# Patient Record
Sex: FEMALE | Race: WHITE | NOT HISPANIC OR LATINO | Employment: OTHER | URBAN - METROPOLITAN AREA
[De-identification: names, ages, dates, MRNs, and addresses within clinical notes are randomized per-mention and may not be internally consistent; named-entity substitution may affect disease eponyms.]

---

## 2024-09-27 ENCOUNTER — TELEPHONE (OUTPATIENT)
Age: 69
End: 2024-09-27

## 2024-09-27 NOTE — TELEPHONE ENCOUNTER
Pt gave [place] information where I sent an RTE which verified[place]. I advised pt to bring info with her as she did not receive the card yet. Also told pt to please bring photo ID and list/meds with her to be added to her chart.    Pt was seen at [facility] ED 2 weeks ago when she arrived to [place] from [place] where she was living.

## 2024-09-27 NOTE — TELEPHONE ENCOUNTER
Please verify if she is okay to come in other wise call [phone] this is her friends number.    Establish Care appointment with Dr. Momin on 9/30.    Patient has Medicare A  and in process of getting Medicare B  Approved by Dept. of human services to receive medical assistance.     Please reach out to the patient if there is a problem.  Thank you!!

## 2024-09-27 NOTE — TELEPHONE ENCOUNTER
Left message for pt to call back    Need to confirm if she will have any active insurance when she come in on Monday. If so we need to put it in. If not we would have put pt in as self pay. Need to make pt aware of this

## 2024-10-03 ENCOUNTER — TELEPHONE (OUTPATIENT)
Dept: INTERNAL MEDICINE CLINIC | Facility: CLINIC | Age: 69
End: 2024-10-03

## 2024-10-03 ENCOUNTER — OFFICE VISIT (OUTPATIENT)
Dept: INTERNAL MEDICINE CLINIC | Facility: CLINIC | Age: 69
End: 2024-10-03
Payer: COMMERCIAL

## 2024-10-03 VITALS
TEMPERATURE: 97.3 F | SYSTOLIC BLOOD PRESSURE: 130 MMHG | DIASTOLIC BLOOD PRESSURE: 96 MMHG | BODY MASS INDEX: 22.58 KG/M2 | OXYGEN SATURATION: 97 % | HEART RATE: 94 BPM | HEIGHT: 60 IN | WEIGHT: 115 LBS

## 2024-10-03 DIAGNOSIS — G89.29 OTHER CHRONIC PAIN: ICD-10-CM

## 2024-10-03 DIAGNOSIS — F41.9 ANXIETY: ICD-10-CM

## 2024-10-03 DIAGNOSIS — E78.1 HYPERTRIGLYCERIDEMIA: ICD-10-CM

## 2024-10-03 DIAGNOSIS — Z76.89 ESTABLISHING CARE WITH NEW DOCTOR, ENCOUNTER FOR: ICD-10-CM

## 2024-10-03 DIAGNOSIS — B35.1 ONYCHOMYCOSIS: ICD-10-CM

## 2024-10-03 DIAGNOSIS — I10 PRIMARY HYPERTENSION: Primary | ICD-10-CM

## 2024-10-03 PROCEDURE — 99203 OFFICE O/P NEW LOW 30 MIN: CPT | Performed by: INTERNAL MEDICINE

## 2024-10-03 RX ORDER — FOLIC ACID 1 MG/1
TABLET ORAL
COMMUNITY

## 2024-10-03 RX ORDER — CICLOPIROX 80 MG/ML
SOLUTION TOPICAL
Qty: 6 ML | Refills: 0 | Status: SHIPPED | OUTPATIENT
Start: 2024-10-03 | End: 2025-04-01

## 2024-10-03 RX ORDER — CLONAZEPAM 2 MG/1
2 TABLET ORAL
Qty: 30 TABLET | Refills: 0 | Status: SHIPPED | OUTPATIENT
Start: 2024-10-03

## 2024-10-03 RX ORDER — CLONAZEPAM 2 MG/1
2 TABLET ORAL 2 TIMES DAILY
COMMUNITY
End: 2024-10-03 | Stop reason: SDUPTHER

## 2024-10-03 RX ORDER — LOSARTAN POTASSIUM 50 MG/1
50 TABLET ORAL DAILY
COMMUNITY
End: 2024-10-03 | Stop reason: SDUPTHER

## 2024-10-03 RX ORDER — FENOFIBRATE 145 MG/1
145 TABLET, COATED ORAL DAILY
Qty: 90 TABLET | Refills: 0 | Status: SHIPPED | OUTPATIENT
Start: 2024-10-03

## 2024-10-03 RX ORDER — OXYCODONE AND ACETAMINOPHEN 5; 325 MG/1; MG/1
1 TABLET ORAL EVERY 4 HOURS PRN
Qty: 7 TABLET | Refills: 0 | Status: SHIPPED | OUTPATIENT
Start: 2024-10-03

## 2024-10-03 RX ORDER — FENOFIBRATE 145 MG/1
145 TABLET, COATED ORAL DAILY
COMMUNITY

## 2024-10-03 RX ORDER — KETOCONAZOLE 20 MG/G
CREAM TOPICAL DAILY
COMMUNITY

## 2024-10-03 RX ORDER — LOSARTAN POTASSIUM 50 MG/1
50 TABLET ORAL DAILY
Qty: 90 TABLET | Refills: 0 | Status: SHIPPED | OUTPATIENT
Start: 2024-10-03

## 2024-10-03 RX ORDER — DIPHENOXYLATE HYDROCHLORIDE AND ATROPINE SULFATE 2.5; .025 MG/1; MG/1
1 TABLET ORAL DAILY
COMMUNITY

## 2024-10-03 NOTE — TELEPHONE ENCOUNTER
Pt reached the after hours service, she states she does not have a printer or transportation to go to an office and get a DAMIR form, she requested the [employer] link and she is going to try and setup care-everywhere to link the charts.

## 2024-10-03 NOTE — TELEPHONE ENCOUNTER
Called and left a message for patient to call back the office , we need her medical records from her ER visit to [facility] , If she has a My chart account she can link care everywhere there , so we can see all the records and test results the # to my chart to call if she needs help with that is  or if she does have the link on her phone for [facility]  to give them permission to release the records to us , she stated she had it on her phone . The # to reach patient is  .

## 2024-10-03 NOTE — PROGRESS NOTES
Ambulatory Visit  Name: Qiana Sanches      : 1955      MRN: [id number]  Encounter Provider: Samuel Momin MD  Encounter Date: 10/3/2024   Encounter department: [facility] INTERNAL MEDICINE    Assessment & Plan  Establishing care with new doctor, encounter for         Primary hypertension  Fairly controlled, continue losartan  Orders:    losartan (COZAAR) 50 mg tablet; Take 1 tablet (50 mg total) by mouth daily    Anxiety  PDMP reviewed, no red flags, 30 days supply given  Orders:    clonazePAM (KlonoPIN) 2 mg tablet; Take 1 tablet (2 mg total) by mouth daily at bedtime    Hypertriglyceridemia  No blood work or to review, patient will get blood work documents from [facility] ER visit.  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  She reports she has a history of hypertension on losartan, hypertriglyceridemia on fenofibrate, anxiety on Klonopin, and chronic low backache due to scoliosis for which she takes as needed narcotic pain medications.  Also reports a discoloration of the right big toe nail.    Reports she was recently evaluated at the [facility] ER d 2 weeks ago for dizziness and was a discharged same day.  Unable to review any care everywhere records as they are not available.  Enies any chest pain shortness of breath nausea vomiting or  diarrhea      Review of Systems   Constitutional:  Negative for appetite change, chills, diaphoresis, fatigue, fever and unexpected weight change.   Respiratory:  Negative for apnea, cough, choking, chest tightness, shortness of breath, wheezing and stridor.    Cardiovascular:  Negative for chest pain, palpitations and leg swelling.   Gastrointestinal:  Negative for abdominal distention, abdominal pain, anal bleeding, blood in stool, constipation, diarrhea, nausea and vomiting.   Genitourinary:  Negative for decreased urine volume, difficulty urinating, frequency and urgency.   Musculoskeletal:  Positive for back pain. Negative for arthralgias and myalgias.   Skin:         Big toenail is discolored   Neurological:  Negative for dizziness, light-headedness, numbness and headaches.           Objective     /96 (BP Location: Left arm, Patient Position: Sitting, Cuff Size: Standard)   Pulse 94   Temp (!) 97.3 °F (36.3 °C) (Temporal)   Ht 5' (1.524 m)   Wt 52.2 kg (115 lb)   SpO2 97%   BMI 22.46 kg/m²     Physical Exam  Constitutional:       General: She is not in acute distress.     Appearance: Normal appearance. She is normal weight. She is not ill-appearing, toxic-appearing or diaphoretic.   Cardiovascular:      Rate and Rhythm: Normal rate and regular rhythm.      Pulses: Normal pulses.      Heart sounds: Normal heart sounds. No murmur heard.     No gallop.   Pulmonary:      Effort: Pulmonary effort is normal. No respiratory distress.      Breath sounds: Normal breath sounds. No stridor. No wheezing, rhonchi or rales.   Chest:      Chest wall: No tenderness.   Musculoskeletal:      Right lower leg: No edema.      Left lower leg: No edema.   Skin:     Comments: Onychomycosis of the big toenail   Neurological:      Mental Status: She is alert and oriented to person, place, and time.

## 2024-10-04 NOTE — TELEPHONE ENCOUNTER
Pt called looking for help getting her lab records from [facility].  She was unsure how to navigate "[employer] to get charts linked. I was instructed to transfer her to the [employer] team for assistance.

## 2024-10-10 ENCOUNTER — OFFICE VISIT (OUTPATIENT)
Dept: INTERNAL MEDICINE CLINIC | Facility: CLINIC | Age: 69
End: 2024-10-10

## 2024-10-10 VITALS
SYSTOLIC BLOOD PRESSURE: 164 MMHG | BODY MASS INDEX: 22.97 KG/M2 | HEIGHT: 60 IN | DIASTOLIC BLOOD PRESSURE: 100 MMHG | OXYGEN SATURATION: 97 % | WEIGHT: 117 LBS | HEART RATE: 104 BPM | TEMPERATURE: 98.3 F

## 2024-10-10 DIAGNOSIS — I10 PRIMARY HYPERTENSION: Primary | ICD-10-CM

## 2024-10-10 PROCEDURE — 99213 OFFICE O/P EST LOW 20 MIN: CPT | Performed by: INTERNAL MEDICINE

## 2024-10-10 NOTE — PROGRESS NOTES
Ambulatory Visit  Name: Qiana Sanches      : 1955      MRN: [id number]  Encounter Provider: Samuel Momin MD  Encounter Date: 10/10/2024   Encounter department: [facility] INTERNAL MEDICINE    Assessment & Plan  Primary hypertension  Repeat /86.  I recommend home BP monitoring, encouraged DASH diet, continue losartan and current dosages.  No blood work available to review, patient says she recently had them done at the [facility].  Follow-up with blood work report in 3 months          History of Present Illness     HPI  Patient comes for follow-up of hypertension        Review of Systems   Constitutional:  Negative for appetite change, chills, diaphoresis, fatigue, fever and unexpected weight change.   Respiratory:  Negative for apnea, cough, choking, chest tightness, shortness of breath, wheezing and stridor.    Cardiovascular:  Negative for chest pain, palpitations and leg swelling.   Gastrointestinal:  Negative for abdominal distention, abdominal pain, anal bleeding, blood in stool, constipation, diarrhea, nausea and vomiting.   Genitourinary:  Negative for decreased urine volume, difficulty urinating, frequency and urgency.   Musculoskeletal:  Negative for arthralgias, back pain and myalgias.   Neurological:  Negative for dizziness, light-headedness, numbness and headaches.     Medical History Reviewed by provider this encounter:  Tobacco  Allergies  Meds  Problems  Med Hx  Surg Hx  Fam Hx       Past Medical History   Past Medical History:   Diagnosis Date    Anxiety     Hypertension     Scoliosis      Past Surgical History:   Procedure Laterality Date    CYST REMOVAL       Family History   Problem Relation Age of Onset    Alzheimer's disease Father      Current Outpatient Medications on File Prior to Visit   Medication Sig Dispense Refill    ciclopirox (PENLAC) 8 % solution Apply topically daily at bedtime 6 mL 0    clonazePAM (KlonoPIN) 2 mg tablet  Take 1 tablet (2 mg total) by mouth daily at bedtime 30 tablet 0    fenofibrate (TRICOR) 145 mg tablet Take 145 mg by mouth daily      fenofibrate (TRICOR) 145 mg tablet Take 1 tablet (145 mg total) by mouth daily 90 tablet 0    folic acid (FOLVITE) 1 mg tablet       ketoconazole (NIZORAL) 2 % cream Apply topically daily      losartan (COZAAR) 50 mg tablet Take 1 tablet (50 mg total) by mouth daily 90 tablet 0    multivitamin (THERAGRAN) TABS Take 1 tablet by mouth daily      oxyCODONE-acetaminophen (Percocet) 5-325 mg per tablet Take 1 tablet by mouth every 4 (four) hours as needed for moderate pain Max Daily Amount: 6 tablets 7 tablet 0     No current facility-administered medications on file prior to visit.     Allergies   Allergen Reactions    Nuts - Food Allergy Hives      Current Outpatient Medications on File Prior to Visit   Medication Sig Dispense Refill    ciclopirox (PENLAC) 8 % solution Apply topically daily at bedtime 6 mL 0    clonazePAM (KlonoPIN) 2 mg tablet Take 1 tablet (2 mg total) by mouth daily at bedtime 30 tablet 0    fenofibrate (TRICOR) 145 mg tablet Take 145 mg by mouth daily      fenofibrate (TRICOR) 145 mg tablet Take 1 tablet (145 mg total) by mouth daily 90 tablet 0    folic acid (FOLVITE) 1 mg tablet       ketoconazole (NIZORAL) 2 % cream Apply topically daily      losartan (COZAAR) 50 mg tablet Take 1 tablet (50 mg total) by mouth daily 90 tablet 0    multivitamin (THERAGRAN) TABS Take 1 tablet by mouth daily      oxyCODONE-acetaminophen (Percocet) 5-325 mg per tablet Take 1 tablet by mouth every 4 (four) hours as needed for moderate pain Max Daily Amount: 6 tablets 7 tablet 0     No current facility-administered medications on file prior to visit.      Social History     Tobacco Use    Smoking status: Every Day     Current packs/day: 0.50     Average packs/day: 0.5 packs/day for 54.8 years (27.4 ttl pk-yrs)     Types: Cigarettes     Start date: 1970    Smokeless tobacco: Never    Vaping Use    Vaping status: Never Used   Substance and Sexual Activity    Alcohol use: Yes     Comment: daily    Drug use: Never    Sexual activity: Not on file         Objective     /100 (BP Location: Left arm, Patient Position: Sitting, Cuff Size: Standard)   Pulse 104   Temp 98.3 °F (36.8 °C) (Temporal)   Ht 5' (1.524 m)   Wt 53.1 kg (117 lb)   SpO2 97%   BMI 22.85 kg/m²     Physical Exam  Constitutional:       General: She is not in acute distress.     Appearance: Normal appearance. She is normal weight. She is not ill-appearing, toxic-appearing or diaphoretic.   Cardiovascular:      Rate and Rhythm: Normal rate and regular rhythm.      Pulses: Normal pulses.      Heart sounds: Normal heart sounds. No murmur heard.     No gallop.   Pulmonary:      Effort: Pulmonary effort is normal. No respiratory distress.      Breath sounds: Normal breath sounds. No stridor. No wheezing, rhonchi or rales.   Chest:      Chest wall: No tenderness.   Musculoskeletal:      Right lower leg: No edema.      Left lower leg: No edema.   Neurological:      Mental Status: She is alert and oriented to person, place, and time.

## 2024-10-22 ENCOUNTER — TELEPHONE (OUTPATIENT)
Age: 69
End: 2024-10-22

## 2024-10-22 DIAGNOSIS — F41.9 ANXIETY: ICD-10-CM

## 2024-10-22 DIAGNOSIS — G89.29 OTHER CHRONIC PAIN: ICD-10-CM

## 2024-10-22 DIAGNOSIS — E56.9 VITAMIN DEFICIENCY: Primary | ICD-10-CM

## 2024-10-22 DIAGNOSIS — B35.1 ONYCHOMYCOSIS: ICD-10-CM

## 2024-10-22 DIAGNOSIS — I10 PRIMARY HYPERTENSION: ICD-10-CM

## 2024-10-22 RX ORDER — FENOFIBRATE 145 MG/1
145 TABLET, COATED ORAL DAILY
Qty: 90 TABLET | Refills: 0 | OUTPATIENT
Start: 2024-10-22

## 2024-10-22 RX ORDER — OXYCODONE AND ACETAMINOPHEN 5; 325 MG/1; MG/1
1 TABLET ORAL EVERY 4 HOURS PRN
Qty: 7 TABLET | Refills: 0 | OUTPATIENT
Start: 2024-10-22

## 2024-10-22 RX ORDER — CLONAZEPAM 2 MG/1
2 TABLET ORAL
Qty: 30 TABLET | Refills: 0 | OUTPATIENT
Start: 2024-10-22

## 2024-10-22 RX ORDER — LOSARTAN POTASSIUM 50 MG/1
50 TABLET ORAL DAILY
Qty: 90 TABLET | Refills: 0 | OUTPATIENT
Start: 2024-10-22

## 2024-10-22 NOTE — TELEPHONE ENCOUNTER
Medication:oxyCODONE-acetaminophen (Percocet) 5-325    Dose/Frequency:  Take 1 tablet by mouth every 4 (four) hours as needed for moderate pain Max Daily Amount: 6 tablets    Quantity: 7 tablet    Pharmacy:   [id number]  [place], [place] [facility] [address] [facility] [address]  [address] [place] [address]    Office:   [x] PCP/Provider -   [] Speciality/Provider -     Does the patient have enough for 3 days?   [] Yes   [x] No - Send as HP to POD       Medication: clonazePAM (KlonoPIN) 2 mg tablet    Dose/Frequency: Take 1 tablet (2 mg total) by mouth daily at bedtime    Quantity:  30 tablet    Pharmacy:   [id number]  [place], [place] [facility] [address] [address]    Office:   [x] PCP/Provider -   [] Speciality/Provider -     Does the patient have enough for 3 days?   [] Yes   [x] No - Send as HP to POD

## 2024-10-22 NOTE — TELEPHONE ENCOUNTER
Medication: clonazePAM (KlonoPIN) 2 mg tablet     Dose/Frequency: Take 1 tablet (2 mg total) by mouth daily at bedtime     Quantity: 30 tabs    Pharmacy: [facility] [place] [id number] - [place], [place] -     Office:   [x] PCP/Provider -   [] Speciality/Provider -     Does the patient have enough for 3 days?   [] Yes   [] No - Send as HP to POD    Medication:     fenofibrate (TRICOR) 145 mg tablet       Dose/Frequency:     Take 1 tablet (145 mg total) by mouth daily       Quantity: 90 tabs    [facility] [place] [id number] - [place], [place] -     Office:   [x] PCP/Provider -   [] Speciality/Provider -     Does the patient have enough for 3 days?   [] Yes   [] No - Send as HP to POD    Medication:     losartan (COZAAR) 50 mg tablet       Dose/Frequency: Take 1 tablet (50 mg total) by mouth daily     Quantity: 90 tabs    Pharmacy: [facility] [place] [id number] - [place], [place] -     Office:   [x] PCP/Provider -   [] Speciality/Provider -     Does the patient have enough for 3 days?   [] Yes   [] No - Send as HP to POD    Medication: oxyCODONE-acetaminophen (Percocet) 5-325 mg per tablet     Dose/Frequency: Take 1 tablet by mouth every 4 (four) hours as needed for moderate pain Max Daily Amount: 6 tablets     Quantity: 7 tabs?    Pharmacy: [facility] [place] [id number] - [place], [place] -     Office:   [x] PCP/Provider -   [] Speciality/Provider -     Does the patient have enough for 3 days?   [] Yes   [x] No - Send as HP to POD

## 2024-10-22 NOTE — TELEPHONE ENCOUNTER
Pt called. Unable to access My Chart. Has contacted IT department, but was told there is a 17 hour wait. Pt reports that she has been emailing Provider, via personal email, to get lab records from recent ED visit and submitted auth for release form. Pt has not heard back from Provider.     Pt requests refills of her medications, states only has enough for the next 5-7 days. Pt states that she was told Dr Momin needs her lab results to refill her medications. Refill request submitted. No lab results seen in EMR.    Pt last seen 10/10/24. Please inform PCP and further advise. Pt is requesting phone call. Thank you

## 2024-10-22 NOTE — TELEPHONE ENCOUNTER
Please let the patient know that a 90-day supply of fenofibrate, losartan was resent on 10/3/2024 to [facility] pharmacy.  30 days supply of her Klonopin, and 6 tablets of Percocet were sent on the same day.  She should not be running out of prescriptions so soon.  I have not received any blood work from her recent ER visit, please let the patient know that future refills can be given, pending receipt of her blood work blood work

## 2024-10-23 ENCOUNTER — PATIENT MESSAGE (OUTPATIENT)
Dept: INTERNAL MEDICINE CLINIC | Facility: CLINIC | Age: 69
End: 2024-10-23

## 2024-10-23 ENCOUNTER — TELEPHONE (OUTPATIENT)
Dept: INTERNAL MEDICINE CLINIC | Facility: CLINIC | Age: 69
End: 2024-10-23

## 2024-10-23 DIAGNOSIS — F11.90 OPIOID USE: Primary | ICD-10-CM

## 2024-10-23 DIAGNOSIS — F41.9 ANXIETY: ICD-10-CM

## 2024-10-23 RX ORDER — LANOLIN ALCOHOL/MO/W.PET/CERES
400 CREAM (GRAM) TOPICAL DAILY
Start: 2024-10-23 | End: 2024-10-24 | Stop reason: SDUPTHER

## 2024-10-23 RX ORDER — DIPHENOXYLATE HYDROCHLORIDE AND ATROPINE SULFATE 2.5; .025 MG/1; MG/1
1 TABLET ORAL DAILY
Qty: 90 TABLET | Refills: 0 | Status: SHIPPED | OUTPATIENT
Start: 2024-10-23 | End: 2025-01-21

## 2024-10-23 RX ORDER — OXYCODONE AND ACETAMINOPHEN 5; 325 MG/1; MG/1
1 TABLET ORAL EVERY 8 HOURS PRN
Start: 2024-10-23 | End: 2024-10-24 | Stop reason: SDUPTHER

## 2024-10-23 RX ORDER — CLONAZEPAM 2 MG/1
2 TABLET ORAL
Qty: 30 TABLET | Refills: 0 | Status: SHIPPED | OUTPATIENT
Start: 2024-11-01 | End: 2024-12-01

## 2024-10-23 RX ORDER — CLONAZEPAM 2 MG/1
2 TABLET ORAL
Start: 2024-10-23

## 2024-10-23 NOTE — TELEPHONE ENCOUNTER
Pt calling RX line- pt very upset and agitated about the medications that are being sent and not sent. Please give pt a call to clarify what is going on. She is not understanding why oxys are not being sent or why she is only being given 7 tablets. Pt doesn't understand why her klonopin isn't being refilled or her folic acid. Pt upset and feels like she is being treated like a ping pong ball. Pt still can not understand why we do not have her lab work. Pt wants all her medications to be set up for next month she only wants to go to the pharmacy 1 time a month. Pt does not understand why all her medications can not be sent at one time. Pt does not have a car and doesn't want to take multiple ubers a month. Pt thinks it is outrageous to wait until 11-1-24 for her medications.   Pt very unhappy with her care.     Please give pt a call at you earliest convenience.

## 2024-10-23 NOTE — TELEPHONE ENCOUNTER
Please let the patient know that 90-day supply of fenofibrate, folic acid, losartan, multivitamin were already sent to the pharmacy.    I can only give 30 days supply of Klonopin and oxycodone at 1 time, combination of her benzodiazepine and narcotics is not recommended, and oxycodone is only for as needed basis .  Opioid agreement paperwork is yet to be completed.   recovery window from their procedure. The patient was made aware that bryce Covid-19  may worsen their prognosis for recovering from their procedure and lend to a higher morbidity and/or mortality risk. All material risks, benefits, and reasonable alternatives including postponing the procedure were discussed. The patient does  wish to proceed with the procedure at this time.       Signed By: Kobi Middleton MD     December 28, 2023 6:44 AM

## 2024-10-23 NOTE — TELEPHONE ENCOUNTER
Folic acid and a multivitamin are over-the-counter meds, I have still center prescription for the same    Recommend Percocet every 8 hours only as needed for severe pain.  Recommend additional use of her Tylenol 500 mg every 4 hours/ibuprofen 400 mg every 3 hours as needed for mild to moderate pain.  She can additionally use local lidocaine patches or Voltaren gel topically for pain improvement.  All these medications are over-the-counter    Cannot refill Klonopin earlier than indicated.    Patient needs to give us documentation of the previous  blood work done, and also complete the opioid agreement to plan before we can give her future refills.

## 2024-10-23 NOTE — TELEPHONE ENCOUNTER
Called and spoke to patient, she hung up mid conversation, patient unhappy with office/provider as she feels insulted Dr. Momin prescribed Narcan. Patient is insisting on having her medications for 30 days, Percocet and klonopin included. She kept cutting me off when I tried to explain anything.      Please advise further.

## 2024-10-24 ENCOUNTER — PATIENT MESSAGE (OUTPATIENT)
Dept: INTERNAL MEDICINE CLINIC | Facility: CLINIC | Age: 69
End: 2024-10-24

## 2024-10-24 ENCOUNTER — TELEPHONE (OUTPATIENT)
Age: 69
End: 2024-10-24

## 2024-10-24 DIAGNOSIS — E56.9 VITAMIN DEFICIENCY: ICD-10-CM

## 2024-10-24 DIAGNOSIS — F11.90 OPIOID USE: ICD-10-CM

## 2024-10-24 DIAGNOSIS — G89.29 OTHER CHRONIC PAIN: ICD-10-CM

## 2024-10-24 RX ORDER — LANOLIN ALCOHOL/MO/W.PET/CERES
400 CREAM (GRAM) TOPICAL DAILY
Qty: 30 TABLET | Refills: 2 | Status: SHIPPED | OUTPATIENT
Start: 2024-10-24 | End: 2025-01-22

## 2024-10-24 RX ORDER — OXYCODONE AND ACETAMINOPHEN 5; 325 MG/1; MG/1
1 TABLET ORAL EVERY 8 HOURS PRN
Qty: 10 TABLET | Refills: 0 | Status: SHIPPED | OUTPATIENT
Start: 2024-10-24

## 2024-10-24 NOTE — TELEPHONE ENCOUNTER
Patient called again to follow-up on her medication/requested refills. Patient states she spoke with Laila within the last hour and was told that her prescriptions were all sent to [facility]. Patient has contacted [facility] and previous pharmacy ([facility]) and neither location has her Percocet ordered. Patient expressing frustration, again stating she should be able to pick these up 6 days early to avoid an additional Uber trip. Relayed to patient that the state regulates how much/how often controlled substances can be dispensed. Patient requested to speak with Laila directly. Attempted transfer to clinical line, unavailable at this time. Please review and advise patient.

## 2024-10-29 ENCOUNTER — PATIENT MESSAGE (OUTPATIENT)
Dept: INTERNAL MEDICINE CLINIC | Facility: CLINIC | Age: 69
End: 2024-10-29

## 2024-10-31 ENCOUNTER — PATIENT MESSAGE (OUTPATIENT)
Dept: INTERNAL MEDICINE CLINIC | Facility: CLINIC | Age: 69
End: 2024-10-31

## 2024-11-05 NOTE — PATIENT COMMUNICATION
Patient called in to establish care with New provider in office. She is concerned about cost of visit. I spoke with clerical regarding. Patient will make appointment and may need to set up payment plan. She is scheduled 11/18/24 with Dr. Cifuentes. She is asking him to review her situation and to be sure he is comfortable with prescribing for her. She mentioned going to pain management as an alternative.

## 2024-11-16 ENCOUNTER — TELEPHONE (OUTPATIENT)
Dept: OTHER | Facility: OTHER | Age: 69
End: 2024-11-16

## 2024-11-16 NOTE — TELEPHONE ENCOUNTER
Pt called because she needs to schedule an appt with pain management. Please call pt at your earliest convenience

## 2024-11-18 ENCOUNTER — TELEPHONE (OUTPATIENT)
Age: 69
End: 2024-11-18

## 2024-11-18 NOTE — TELEPHONE ENCOUNTER
Patient called back, would like to still see Dr Cifuentes  until she can see pain management for her meds.

## 2024-11-21 ENCOUNTER — TELEPHONE (OUTPATIENT)
Age: 69
End: 2024-11-21

## 2024-11-21 NOTE — TELEPHONE ENCOUNTER
Transferred pt to [facility] for refill. I read [facility] office disclaimer. Pt stated she needs a refill for the oxy ,folic acid (FOLVITE) 400 mcg tablet, clonazePAM (KlonoPIN) 2 mg tablet.   I advised pt to contact PCP.

## 2024-11-26 ENCOUNTER — TELEPHONE (OUTPATIENT)
Age: 69
End: 2024-11-26

## 2024-11-26 ENCOUNTER — OFFICE VISIT (OUTPATIENT)
Dept: FAMILY MEDICINE CLINIC | Facility: CLINIC | Age: 69
End: 2024-11-26

## 2024-11-26 VITALS
OXYGEN SATURATION: 97 % | TEMPERATURE: 97.9 F | SYSTOLIC BLOOD PRESSURE: 132 MMHG | HEART RATE: 101 BPM | BODY MASS INDEX: 24.6 KG/M2 | RESPIRATION RATE: 18 BRPM | HEIGHT: 59 IN | WEIGHT: 122 LBS | DIASTOLIC BLOOD PRESSURE: 60 MMHG

## 2024-11-26 DIAGNOSIS — G89.29 OTHER CHRONIC PAIN: ICD-10-CM

## 2024-11-26 DIAGNOSIS — E78.1 HYPERTRIGLYCERIDEMIA: ICD-10-CM

## 2024-11-26 DIAGNOSIS — M41.9 SCOLIOSIS, UNSPECIFIED SCOLIOSIS TYPE, UNSPECIFIED SPINAL REGION: Primary | ICD-10-CM

## 2024-11-26 DIAGNOSIS — I10 PRIMARY HYPERTENSION: ICD-10-CM

## 2024-11-26 DIAGNOSIS — F17.219 CIGARETTE NICOTINE DEPENDENCE WITH NICOTINE-INDUCED DISORDER: ICD-10-CM

## 2024-11-26 DIAGNOSIS — B35.1 ONYCHOMYCOSIS: ICD-10-CM

## 2024-11-26 DIAGNOSIS — R74.8 ELEVATED LIVER ENZYMES: ICD-10-CM

## 2024-11-26 DIAGNOSIS — F41.9 ANXIETY: ICD-10-CM

## 2024-11-26 DIAGNOSIS — F10.10 ALCOHOL ABUSE: ICD-10-CM

## 2024-11-26 PROCEDURE — 99203 OFFICE O/P NEW LOW 30 MIN: CPT | Performed by: FAMILY MEDICINE

## 2024-11-26 RX ORDER — CLONAZEPAM 2 MG/1
2 TABLET ORAL
Qty: 30 TABLET | Refills: 0 | Status: SHIPPED | OUTPATIENT
Start: 2024-11-26 | End: 2024-11-29 | Stop reason: SDUPTHER

## 2024-11-26 RX ORDER — OXYCODONE AND ACETAMINOPHEN 5; 325 MG/1; MG/1
1 TABLET ORAL DAILY PRN
Qty: 10 TABLET | Refills: 0 | Status: SHIPPED | OUTPATIENT
Start: 2024-11-26

## 2024-11-26 NOTE — ASSESSMENT & PLAN NOTE
Give 10 tabs of percocet for daily prn use. SE educated pt. Do not take it with clonazepam or alcohol which may cause overdose.   I told pt we do not recommend long term opioid for pain.   Plan to check back xray and refer to specialist when she gets her medical insurance.       Orders:    oxyCODONE-acetaminophen (Percocet) 5-325 mg per tablet; Take 1 tablet by mouth daily as needed for severe pain Max Daily Amount: 1 tablet

## 2024-11-26 NOTE — TELEPHONE ENCOUNTER
Patient called in upset stating that she has not received a new script as she has requested for clonazepam.   Patient states prescription needs to be changed to a higher dose due to having seizures. Please review and send in to   [facility] [place] [id number] - [place], [place] -

## 2024-11-26 NOTE — PROGRESS NOTES
Name: Qiana Sanches      : 1955      MRN: [id number]  Encounter Provider: Chino Wright MD  Encounter Date: 2024   Encounter department: [facility] (formerly [facility]3] PRACTICE  :  Assessment & Plan  Scoliosis, unspecified scoliosis type, unspecified spinal region  Pt refused xray or see specialist today because self pay.          Other chronic pain  Give 10 tabs of percocet for daily prn use. SE educated pt. Do not take it with clonazepam or alcohol which may cause overdose.   I told pt we do not recommend long term opioid for pain.   Plan to check back xray and refer to specialist when she gets her medical insurance.       Orders:    oxyCODONE-acetaminophen (Percocet) 5-325 mg per tablet; Take 1 tablet by mouth daily as needed for severe pain Max Daily Amount: 1 tablet    Anxiety  Give clonazepam. SE educated pt. Do not use it with alcohol.   Orders:    clonazePAM (KlonoPIN) 2 mg tablet; Take 1 tablet (2 mg total) by mouth daily at bedtime as needed for seizures    Primary hypertension  DASH diet. Continue losartan 50mg QD.        Hypertriglyceridemia  Low fat diet. Continue fenofibrate 145mg daily.        Onychomycosis  Continue ciclopirox solution.        Alcohol abuse  Strongly advised pt to quit!       Elevated liver enzymes  2024 AST 83, ALT 31. Pt refused to recheck labs because self pay.        Cigarette nicotine dependence with nicotine-induced disorder  Strongly advised pt to quit!             Depression Screening and Follow-up Plan: Patient was screened for depression during today's encounter. They screened negative with a PHQ-2 score of 2.    Tobacco Cessation Counseling: Tobacco cessation counseling was provided. The patient is sincerely urged to quit consumption of tobacco. She is ready to quit tobacco. Medication options and side effects of medication discussed. Patient refused medication.       History of Present Illness     HPI    Pt is here with her therapeutic dog to establish care.    Was in [place] for years.   Came here in 9/2024. Live with her friends/10 people in a house now.     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     Insomnia/anxiety---for years.   She uses clonazepam 2mg qhs which helped. She can sleep 4-5 hours with clonazepam.   Need refill of clonazepam today.     Scoliosis---for years. Lower back pain constantly.   Saw pain specialist in [place] before. Xray showed scoliosis per pt.   Denies hx of surgery of back.   She was on opioid medication, tylenol with codeine, percocet etc per pt. Need refill today.     HTN---For 5 years. She is on losartn 50mg QD. BP is ok today.     Hyperlipidemia---For 5 years. She is on fenofibrate 145mg QD. Refused labs because self pay now.   She is on fish oil.     Onychomycosis----She uses ciclopirox and ketoconazole cream.     Smoke 0.5ppd for 20 years. Refused nicotine patch/gum/medication because she is self pay.   Alcohol 1-2 glass of hard liquor, several times per week.   No drugs.     Does all ADL's.   Denies recent falls.   Always depressed. Refused medications. She has her therapeutic dog.         Review of Systems   Constitutional:  Negative for appetite change, chills and fever.   HENT:  Negative for congestion, ear pain, sinus pain and sore throat.    Eyes:  Negative for discharge and itching.   Respiratory:  Negative for apnea, cough, chest tightness, shortness of breath and wheezing.    Cardiovascular:  Negative for chest pain, palpitations and leg swelling.   Gastrointestinal:  Negative for abdominal pain, anal bleeding, constipation, diarrhea, nausea and vomiting.   Endocrine: Negative for cold intolerance, heat intolerance and polyuria.   Genitourinary:  Negative for difficulty urinating and dysuria.   Musculoskeletal:  Positive for back pain. Negative for arthralgias and myalgias.   Skin:  Negative for rash.   Neurological:  Negative for dizziness and headaches.   Psychiatric/Behavioral:  Positive for sleep  disturbance. Negative for agitation, self-injury and suicidal ideas. The patient is nervous/anxious.      Past Medical History   Past Medical History:   Diagnosis Date    Anxiety     Hypertension     Scoliosis      Past Surgical History:   Procedure Laterality Date    CYST REMOVAL      HYSTERECTOMY      years ago per pt     Family History   Problem Relation Age of Onset    Hypertension Mother     Alzheimer's disease Father       reports that she has been smoking cigarettes. She started smoking about 54 years ago. She has a 10 pack-year smoking history. She has never used smokeless tobacco. She reports current alcohol use of about 21.0 standard drinks of alcohol per week. She reports that she does not use drugs.  Current Outpatient Medications on File Prior to Visit   Medication Sig Dispense Refill    ciclopirox (PENLAC) 8 % solution Apply topically daily at bedtime 6 mL 0    fenofibrate (TRICOR) 145 mg tablet Take 1 tablet (145 mg total) by mouth daily 90 tablet 0    folic acid (FOLVITE) 400 mcg tablet Take 1 tablet (400 mcg total) by mouth daily 30 tablet 2    ketoconazole (NIZORAL) 2 % cream Apply topically daily      losartan (COZAAR) 50 mg tablet Take 1 tablet (50 mg total) by mouth daily 90 tablet 0    multivitamin (THERAGRAN) TABS Take 1 tablet by mouth daily 90 tablet 0    [DISCONTINUED] clonazePAM (KlonoPIN) 2 mg tablet Take 1 tablet (2 mg total) by mouth daily at bedtime Do not start before November 1, 2024. 30 tablet 0    [DISCONTINUED] oxyCODONE-acetaminophen (Percocet) 5-325 mg per tablet Take 1 tablet by mouth every 8 (eight) hours as needed for severe pain Max Daily Amount: 3 tablets 10 tablet 0    naloxone (NARCAN) 4 mg/0.1 mL nasal spray Administer 1 spray into a nostril. If no response after 2-3 minutes, give another dose in the other nostril using a new spray. 1 each 1    [DISCONTINUED] fenofibrate (TRICOR) 145 mg tablet Take 145 mg by mouth daily       No current facility-administered medications  "on file prior to visit.     Allergies   Allergen Reactions    Morphine Itching    Nuts - Food Allergy Hives    Tramadol Headache           Objective   /60 (BP Location: Right arm, Patient Position: Sitting, Cuff Size: Standard)   Pulse 101   Temp 97.9 °F (36.6 °C) (Temporal)   Resp 18   Ht 4' 10.75\" (1.492 m)   Wt 55.3 kg (122 lb)   SpO2 97%   BMI 24.85 kg/m²      Physical Exam  Constitutional:       General: She is not in acute distress.     Appearance: She is well-developed.   HENT:      Head: Normocephalic.   Eyes:      General:         Right eye: No discharge.         Left eye: No discharge.      Conjunctiva/sclera: Conjunctivae normal.   Neck:      Thyroid: No thyromegaly.   Cardiovascular:      Rate and Rhythm: Normal rate and regular rhythm.      Heart sounds: Normal heart sounds. No murmur heard.     No friction rub. No gallop.   Pulmonary:      Effort: Pulmonary effort is normal. No respiratory distress.      Breath sounds: Normal breath sounds. No wheezing or rales.   Chest:      Chest wall: No tenderness.   Abdominal:      General: Bowel sounds are normal. There is no distension.      Palpations: Abdomen is soft. There is no mass.      Tenderness: There is no abdominal tenderness. There is no guarding or rebound.   Musculoskeletal:         General: Tenderness present. No deformity. Normal range of motion.      Cervical back: Normal range of motion.      Comments: Lower back tenderness, no swollen or erythema   Lymphadenopathy:      Cervical: No cervical adenopathy.   Neurological:      Mental Status: She is alert.         "

## 2024-11-26 NOTE — TELEPHONE ENCOUNTER
Pt called in as she is at pharmacy to  her clonazepam. The order is showing that order is early by 5 days. The patient said that she needs her script changed to be more frequent or a higher dose so that the pharmacy can order for her. Please advise. Pt states she's taking for anxiety not for seizures. Please call patient back either way to let her know if it can be done.

## 2024-11-26 NOTE — TELEPHONE ENCOUNTER
Patient called stating she is not sure if she has the energy to get to her appointment today. She requested I leave it and she will try to make it, but she wanted me to make the provider aware of her situation. She is establishing care and hasn't slept because she is out of her clonazipan, folic acid and oxycodone. She requested I let the provider know in advance because she might not make it to the appointment but is aware to call back if anything changes. Please advise back out to patient if needed.

## 2024-11-26 NOTE — ASSESSMENT & PLAN NOTE
Give clonazepam. SE educated pt. Do not use it with alcohol.   Orders:    clonazePAM (KlonoPIN) 2 mg tablet; Take 1 tablet (2 mg total) by mouth daily at bedtime as needed for seizures

## 2024-11-27 NOTE — TELEPHONE ENCOUNTER
Pt called back. Said that she will not be going to neurologist. Said that she does not have seizures. Said that she only takes for anxiety. However directions on script say for seizures. Pt wants dose more frequently as she uses for sleep every night and then wants for anxiety during the day. Called pharmacy for pt to confirm that she can pick the current script up 3 days early which will be tomorrow.     She wants to know if she needs to continue the folic acid. She's taking a multivitamin that does have folic acid in it. If so please sent the folic acid to [facility].

## 2024-11-27 NOTE — TELEPHONE ENCOUNTER
This patient gave different history. She never mentioned seizure to me in office visit. If she had history of seizure, she needs to be seen by neurologist.

## 2024-11-29 DIAGNOSIS — F41.9 ANXIETY: ICD-10-CM

## 2024-11-29 RX ORDER — CLONAZEPAM 2 MG/1
2 TABLET ORAL
Start: 2024-11-29 | End: 2024-12-29

## 2024-11-29 RX ORDER — DULOXETIN HYDROCHLORIDE 20 MG/1
20 CAPSULE, DELAYED RELEASE ORAL DAILY
Qty: 30 CAPSULE | Refills: 2 | Status: SHIPPED | OUTPATIENT
Start: 2024-11-29 | End: 2024-12-11 | Stop reason: RX

## 2024-11-29 NOTE — TELEPHONE ENCOUNTER
Clonazepam is a control substance which may cause addiction/dependent/overdose etc. I do NOT recommend more. If she feels her anxiety does not controlled, we have other medications to help.   If she takes multivitamin including folic acid, she can stop folic acid itself.

## 2024-11-29 NOTE — TELEPHONE ENCOUNTER
"Spoke with pt, she is OK with taking something other than Clonazepam- she is taking it to sleep every night and with every \"anxiety attack\" intermittently.   "

## 2024-12-10 ENCOUNTER — TELEPHONE (OUTPATIENT)
Dept: FAMILY MEDICINE CLINIC | Facility: CLINIC | Age: 69
End: 2024-12-10

## 2024-12-10 NOTE — TELEPHONE ENCOUNTER
Pt returning call; she is [facility] in [place] and she called them 10 days ago to have the Cymbalta delivered and she is still waiting. She has been taking her clonazepam and breaking them in half and taking Advil PM to help sleep with the pain. Please advise, TY.

## 2024-12-10 NOTE — TELEPHONE ENCOUNTER
Spoke to pharmacy, they state that they are currently out of the cymbalta and they have been trying to reach her. Was able to speak to patient, made her aware they are out of stock currently. Patient states she understands but she would like to know what can she do because her anxiety and depression are bad she got emotional over the phone. Please advise on what to do

## 2024-12-11 DIAGNOSIS — F41.8 ANXIETY WITH DEPRESSION: Primary | ICD-10-CM

## 2024-12-11 RX ORDER — SERTRALINE HYDROCHLORIDE 25 MG/1
25 TABLET, FILM COATED ORAL DAILY
Qty: 30 TABLET | Refills: 2 | Status: SHIPPED | OUTPATIENT
Start: 2024-12-11 | End: 2025-06-09

## 2024-12-11 NOTE — TELEPHONE ENCOUNTER
Patient called back I did let her know that  Her Zoloft is received by [facility] and receipt is confirmed by pharmacy today at 8:13 AM.    Patient does not have any further questions.  NFA  Thank you!!

## 2024-12-26 ENCOUNTER — NURSE TRIAGE (OUTPATIENT)
Age: 69
End: 2024-12-26

## 2024-12-26 DIAGNOSIS — B35.1 ONYCHOMYCOSIS: ICD-10-CM

## 2024-12-26 DIAGNOSIS — I10 PRIMARY HYPERTENSION: ICD-10-CM

## 2024-12-26 RX ORDER — LOSARTAN POTASSIUM 50 MG/1
50 TABLET ORAL DAILY
Qty: 90 TABLET | Refills: 1 | Status: SHIPPED | OUTPATIENT
Start: 2024-12-26

## 2024-12-26 RX ORDER — FENOFIBRATE 145 MG/1
145 TABLET, COATED ORAL DAILY
Qty: 90 TABLET | Refills: 0 | Status: SHIPPED | OUTPATIENT
Start: 2024-12-26

## 2024-12-26 NOTE — TELEPHONE ENCOUNTER
"Pt called. Transferred from [place] to [facility] for c/o alcohol withdrawal and requesting refill of anxiety medication.     Pt is anxious, but oriented to person, place, and situation. Triage completed, see below. Advised and 911/EMS was called with Pt's permission. Pt to proceed to nearest ED now. Remained on phone with Pt until  arrived, 8:49 am, and informed Pt EMS was behind him and en route. Pt disconnected the call at that time.     Pt does have financial concerns with regard to go to the emergency department. Briefly discussed increased stress of the holidays with family in [place]. Encouraged Pt to discuss the same while in ED so resources can be made available.    Office informed.      Reason for Disposition   Seeing, hearing, or feeling things that are not there (i.e., visual, auditory, or tactile hallucinations)    Answer Assessment - Initial Assessment Questions  1. DRINKING PROBLEM: \"Do you have or have you ever had a drinking problem?\"      Pt has hx of alcohol abuse. Last had alcohol two days ago.    2. SYMPTOMS: \"What symptoms are you currently experiencing?\" (e.g., none, tremors or shakiness, abdominal pain, vomiting, blackout spells)      Pt currently experiencing tremors, shakiness, disorganized thoughts, light headedness, visual hallucinations, and insomnia. Pt admits to taking 4 or 5 tylenol PM doses through the night to sleep, but has not been able to sleep.    Protocols used: Alcohol Abuse and Dependence-ADULT-[place]    "

## 2024-12-28 ENCOUNTER — NURSE TRIAGE (OUTPATIENT)
Dept: OTHER | Facility: OTHER | Age: 69
End: 2024-12-28

## 2024-12-29 ENCOUNTER — NURSE TRIAGE (OUTPATIENT)
Dept: OTHER | Facility: OTHER | Age: 69
End: 2024-12-29

## 2024-12-29 NOTE — TELEPHONE ENCOUNTER
"Regarding: Medication problems / Insomnia  ----- Message from Tania YANEZ sent at 12/28/2024  9:56 PM EST -----  \"I was prescribed traZODone (DESYREL) 50 MG tablet by the ED. Yesterday it helped me to sleep, and today I took 1 tablet and a small piece and its not working.\"    "

## 2024-12-29 NOTE — TELEPHONE ENCOUNTER
"Reason for Disposition   Patient sounds very upset or troubled to the triager    Answer Assessment - Initial Assessment Questions  1. CONCERN: \"Did anything happen that prompted you to call today?\"         Was in the hospital at [facility] recently and all meds were changed, patient recently stopped drinking. Anxious about flight this week to [place]     2. ANXIETY SYMPTOMS: \"Can you describe how you (your loved one; patient) have been feeling?\" (e.g., tense, restless, panicky, anxious, keyed up, overwhelmed, sense of impending doom).         Overwhelmed, restless, anxious, feels angry     3. ONSET: \"How long have you been feeling this way?\" (e.g., hours, days, weeks)        Ongoing     4. SEVERITY: \"How would you rate the level of anxiety?\" (e.g., 0 - 10; or mild, moderate, severe).        25/10    5. FUNCTIONAL IMPAIRMENT: \"How have these feelings affected your ability to do daily activities?\" \"Have you had more difficulty than usual doing your normal daily activities?\" (e.g., getting better, same, worse; self-care, school, work, interactions)        Worse     6. HISTORY: \"Have you felt this way before?\" \"Have you ever been diagnosed with an anxiety problem in the past?\" (e.g., generalized anxiety disorder, panic attacks, PTSD). If Yes, ask: \"How was this problem treated?\" (e.g., medicines, counseling, etc.)        Yes, this is an ongoing problem     7. RISK OF HARM - SUICIDAL IDEATION: \"Do you ever have thoughts of hurting or killing yourself?\" If Yes, ask:  \"Do you have these feelings now?\" \"Do you have a plan on how you would do this?\"        Denies     8. TREATMENT:  \"What has been done so far to treat this anxiety?\" (e.g., medicines, relaxation strategies). \"What has helped?\"        Has been on medications for anxiety     9. THERAPIST: \"Do you have a counselor or therapist?\" If Yes, ask: \"What is their name?\"        Sees doctors in [place]     10. POTENTIAL TRIGGERS: \"Do you drink caffeinated beverages (e.g., " "coffee, robert, teas), and how much daily?\" \"Do you drink alcohol or use any drugs?\" \"Have you started any new medicines recently?\"          Smokes cigarettes, quit drinking alcohol on 12/24     11. PATIENT SUPPORT: \"Who is with you now?\" \"Who do you live with?\" \"Do you have family or friends who you can talk to?\"           Visiting friends     12. OTHER SYMPTOMS: \"Do you have any other symptoms?\" (e.g., feeling depressed, trouble concentrating, trouble sleeping, trouble breathing, palpitations or fast heartbeat, chest pain, sweating, nausea, or diarrhea)            Feels ramirez beating fast, no other symptoms    Protocols used: Anxiety and Panic Attack-Adult-AH    "

## 2024-12-29 NOTE — TELEPHONE ENCOUNTER
"seen in [facility] ED 12/26 for alcohol withdrawal and insomnia. She was prescribed Trazodone 50mg to help her sleep. She notes it worked last night, but is not helping this evening. Reports she is wide awake. She is asking if she can take an additional tablet of Trazodone.     On call provider notified   Reason for Disposition   [1] Caller has URGENT medicine question about med that PCP or specialist prescribed AND [2] triager unable to answer question    Answer Assessment - Initial Assessment Questions  1. NAME of MEDICINE: \"What medicine(s) are you calling about?\"      Trazodone 50mg  2. QUESTION: \"What is your question?\" (e.g., double dose of medicine, side effect)      I was prescribed Trazodone to help me sleep and its not working. Can I take another pill to see if that helps?  3. PRESCRIBER: \"Who prescribed the medicine?\" Reason: if prescribed by specialist, call should be referred to that group.      [facility] ED  4. SYMPTOMS: \"Do you have any symptoms?\" If Yes, ask: \"What symptoms are you having?\"  \"How bad are the symptoms (e.g., mild, moderate, severe)      insomnia    Protocols used: Medication Question Call-Adult-AH    "

## 2024-12-29 NOTE — TELEPHONE ENCOUNTER
Spoke with on call provider-  Patient may take ONE  additional trazodone to see if that help the insomnia    Patient notified of provider recommendations and verbalized understanding.

## 2024-12-30 ENCOUNTER — TELEPHONE (OUTPATIENT)
Age: 69
End: 2024-12-30

## 2024-12-30 DIAGNOSIS — F41.9 ANXIETY: Primary | ICD-10-CM

## 2024-12-30 RX ORDER — HYDROXYZINE HYDROCHLORIDE 25 MG/1
25 TABLET, FILM COATED ORAL 2 TIMES DAILY PRN
Qty: 20 TABLET | Refills: 0 | Status: SHIPPED | OUTPATIENT
Start: 2024-12-30 | End: 2025-01-07 | Stop reason: ALTCHOICE

## 2024-12-30 NOTE — TELEPHONE ENCOUNTER
Pt is requesting a call back from Dr. Wright. She stated that she needs something for sleep as she's flying to [place] at some point this week. Pt also made mention that she's been been taking her trazodone and melatonin incorrectly and still hasn't been able to sleep and that something needs to change.    Please contact to advise.

## 2024-12-30 NOTE — TELEPHONE ENCOUNTER
I called pt back. Pt states she took trazodone 150mg and melatonin 3mg last night, she only slept for 1 hour. I will give her hydroxyzine 25mg bid. SE educated pt.   Pt ask me to give her refill of clonazepam. Pt states she did not drink alcohol any more. I told pt we have to see her in office to make sure her UDS clean before prescribing any control substance.   Also, pt mentioned she will go to [place] to see her family and then go to [place] soon.

## 2024-12-30 NOTE — TELEPHONE ENCOUNTER
Patient called stating she is still waiting for a call back. Patient is traveling in 2 days and needs an answer on this matter as soon as dr Wright is available.

## 2024-12-31 ENCOUNTER — NURSE TRIAGE (OUTPATIENT)
Dept: OTHER | Facility: OTHER | Age: 69
End: 2024-12-31

## 2024-12-31 ENCOUNTER — CLINICAL SUPPORT (OUTPATIENT)
Dept: FAMILY MEDICINE CLINIC | Facility: CLINIC | Age: 69
End: 2024-12-31

## 2024-12-31 DIAGNOSIS — F41.9 ANXIETY: ICD-10-CM

## 2024-12-31 DIAGNOSIS — F11.20 CONTINUOUS OPIOID DEPENDENCE (HCC): Primary | ICD-10-CM

## 2024-12-31 PROCEDURE — 99211 OFF/OP EST MAY X REQ PHY/QHP: CPT

## 2024-12-31 RX ORDER — CLONAZEPAM 2 MG/1
2 TABLET ORAL
Qty: 5 TABLET | Refills: 0 | Status: SHIPPED | OUTPATIENT
Start: 2024-12-31 | End: 2025-01-06 | Stop reason: SDUPTHER

## 2024-12-31 NOTE — TELEPHONE ENCOUNTER
From note, she will fly out on 1/1/2025 at 1630. When will she come back to [place]?   She can do UDS urine test here today (nurse visit) in office.

## 2024-12-31 NOTE — TELEPHONE ENCOUNTER
Pt calling back because she still can't sleep.  She took one Trazadone at 8pm and again at 2am.  It calmed her down but she still cannot sleep.  She would like to know what else she can do to help, as she has been trying many different calming techniques but still cannot fall asleep.  Patient reported that she may try another melatonin, but it only helps for about 45 minutes.      Empathy was provided.  Offered to contact on-call provider, but patient reports that she already spoke to her about this problem.  Attempted to suggest other calming techniques, however, patient has already tried them.  Patient thanked triage RN for listening and asked that Dr. Wright be notified of this information.

## 2024-12-31 NOTE — TELEPHONE ENCOUNTER
"Reason for Disposition   [1] Caller has NON-URGENT medicine question about med that PCP prescribed AND [2] triager unable to answer question    Answer Assessment - Initial Assessment Questions  1. NAME of MEDICINE: \"What medicine(s) are you calling about?\"      Hydroxyzine 25 mg     2. QUESTION: \"What is your question?\" (e.g., double dose of medicine, side effect)      \"The medication is not helping me sleep or helping my anxiety, it is 0200 in the morning and I am still awake\"    3. PRESCRIBER: \"Who prescribed the medicine?\" Reason: if prescribed by specialist, call should be referred to that group.      Chino Wright    4. SYMPTOMS: \"Do you have any symptoms?\" If Yes, ask: \"What symptoms are you having?\"  \"How bad are the symptoms (e.g., mild, moderate, severe)      Eyes started itching, feeling clammy, all numbers looked the same on the computer, vision blurry, head spinning-took first dose at 1930, symptoms lasted 30-40 minutes Now is wide awake and feeling anxious      Patient took Melatonin 5 mg and Trazodone 50 mg 5 minutes ago to help her feel less anxious and get some sleep    Patient requesting a call back from the office to schedule urine test. She is flying out January 1st at 1630 and would like to get the urine test done before then.    Protocols used: Medication Question Call-Adult-    "

## 2025-01-02 ENCOUNTER — TELEPHONE (OUTPATIENT)
Dept: FAMILY MEDICINE CLINIC | Facility: CLINIC | Age: 70
End: 2025-01-02

## 2025-01-02 NOTE — TELEPHONE ENCOUNTER
Patient scheduled online for 1/6/24 with Nehal Delatorre to discuss medication refills. Results of lab orders still pending. Please advise if appointment should be postponed until results are received.

## 2025-01-03 ENCOUNTER — TELEPHONE (OUTPATIENT)
Age: 70
End: 2025-01-03

## 2025-01-03 LAB

## 2025-01-03 NOTE — TELEPHONE ENCOUNTER
Patient is calling to request that a provider look over her drug urine results and call her with those results.

## 2025-01-04 ENCOUNTER — DOCUMENTATION (OUTPATIENT)
Dept: FAMILY MEDICINE CLINIC | Facility: CLINIC | Age: 70
End: 2025-01-04

## 2025-01-04 ENCOUNTER — NURSE TRIAGE (OUTPATIENT)
Dept: OTHER | Facility: OTHER | Age: 70
End: 2025-01-04

## 2025-01-04 DIAGNOSIS — F41.9 ANXIETY: Primary | ICD-10-CM

## 2025-01-04 RX ORDER — CLONAZEPAM 2 MG/1
2 TABLET ORAL
Qty: 1 TABLET | Refills: 0 | Status: SHIPPED | OUTPATIENT
Start: 2025-01-04 | End: 2025-01-06 | Stop reason: SDUPTHER

## 2025-01-04 NOTE — TELEPHONE ENCOUNTER
"Reason for Disposition  • Caller requesting a CONTROLLED substance prescription refill (e.g., narcotics, ADHD medicines)    Answer Assessment - Initial Assessment Questions  1. DRUG NAME: \"What medicine do you need to have refilled?\"        Clonazepam    2. REFILLS REMAINING: \"How many refills are remaining?\" (Note: The label on the medicine or pill bottle will show how many refills are remaining. If there are no refills remaining, then a renewal may be needed.)        0    3. EXPIRATION DATE: \"What is the expiration date?\" (Note: The label states when the prescription will , and thus can no longer be refilled.)        N/A     4. PRESCRIBING HCP: \"Who prescribed it?\" Reason: If prescribed by specialist, call should be referred to that group.        Dr. Wright    5. SYMPTOMS: \"Do you have any symptoms?\"        N/A       Discussed with on call provider Dr. Li who sent 1 clonazepam for tomorrow night and advised patient to contact office on Monday for remainder of med refill requests. Patient made aware and expressed understanding. No further questions at this time.    Protocols used: Medication Refill and Renewal Call-Adult-    "

## 2025-01-04 NOTE — TELEPHONE ENCOUNTER
"Regarding: Medication refill  ----- Message from Lizette HADLEY sent at 1/4/2025 12:40 PM EST -----  Pt stated, \" I would like to speak with an on call provider about getting my medication refilled. I can not wait two days for it to be refilled. [facility] and I need a refill on that medication as well.\"      Pt was made aware that controlled substances can not be refilled after hour and requested to speak with a provider.        clonazePAM (KlonoPIN) 2 mg tablet [[id number]]    Order Details  Dose: 2 mg Route: Oral Frequency: Daily at bedtime PRN for anxiety  Dispense Quantity: 5 tablet          oxyCODONE-acetaminophen (Percocet) 5-325 mg per tablet [[id number]]  DISCONTINUED    Order Details  Dose: 1 tablet Route: Oral Frequency: Daily PRN for severe pain  Dispense Quantity: 10 tablet Refills: 0    "

## 2025-01-06 DIAGNOSIS — F41.9 ANXIETY: ICD-10-CM

## 2025-01-06 RX ORDER — CLONAZEPAM 2 MG/1
2 TABLET ORAL
Qty: 5 TABLET | Refills: 0 | Status: SHIPPED | OUTPATIENT
Start: 2025-01-06 | End: 2025-01-11

## 2025-01-06 RX ORDER — CLONAZEPAM 2 MG/1
2 TABLET ORAL
Qty: 1 TABLET | Refills: 0 | OUTPATIENT
Start: 2025-01-06

## 2025-01-06 NOTE — TELEPHONE ENCOUNTER
Medication: clonazePAM (KlonoPIN) 2 mg tablet     Dose/Frequency: Take 1 tablet (2 mg total) by mouth daily at bedtime as needed for anxiety (insomnia)     Quantity: 30 requested    Pharmacy: [facility] [address] [place], [place] [address]Dee Walker [id number]  Nufkf-[phone]    Office:   [x] PCP/Provider - Chino Wright MD   [] Speciality/Provider -     Does the patient have enough for 3 days?   [] Yes   [x] No - Send as HP to POD

## 2025-01-06 NOTE — TELEPHONE ENCOUNTER
No percocet from our office. Pt needs to check images and see specialist for it.   UDS is ok. For future refills of clonazepam, pt needs to sign control substance agreement in office.   Pt may continue trazodone.   Pt needs an appt in office.

## 2025-01-06 NOTE — TELEPHONE ENCOUNTER
Patient is requesting 5 pills of the Clonazepam until her office visit here on 1/10.     Please call when sent to the pharmacy. Thank you!

## 2025-01-06 NOTE — TELEPHONE ENCOUNTER
Patient called back and would like a call directly from the provider to discuss her medications  Please review

## 2025-01-06 NOTE — TELEPHONE ENCOUNTER
Patient called back , was wondering if Dr Wright would recommend her staying on the trazodone or not . Please advise and contact pt.

## 2025-01-06 NOTE — TELEPHONE ENCOUNTER

## 2025-01-06 NOTE — TELEPHONE ENCOUNTER
Pt has LBP, scoliosis and is requesting new order  for a 30 day supply for oxyCODONE-acetaminophen (Percocet) 5-325 mg per tablet     Pt also asking for new order for Trazodone. Pt was prescribed this previously by an provider at an ED.     Please call her to discuss. #[phone]

## 2025-01-06 NOTE — TELEPHONE ENCOUNTER
Spoke to patient and rescheduled appointment for 1/7/25 with Nehal Delatorre. Made patient aware prescription can not be sent until patient is seen per providers instructions. Patient requests call back regarding recommendations for trazadone- patient states they are not able to sleep- asked to confirm how many to take.

## 2025-01-07 ENCOUNTER — OFFICE VISIT (OUTPATIENT)
Dept: FAMILY MEDICINE CLINIC | Facility: CLINIC | Age: 70
End: 2025-01-07

## 2025-01-07 VITALS
WEIGHT: 120.6 LBS | OXYGEN SATURATION: 96 % | TEMPERATURE: 97.6 F | HEART RATE: 92 BPM | SYSTOLIC BLOOD PRESSURE: 114 MMHG | HEIGHT: 59 IN | BODY MASS INDEX: 24.31 KG/M2 | DIASTOLIC BLOOD PRESSURE: 72 MMHG | RESPIRATION RATE: 18 BRPM

## 2025-01-07 DIAGNOSIS — F31.61 BIPOLAR DISORDER, CURRENT EPISODE MIXED, MILD (HCC): ICD-10-CM

## 2025-01-07 DIAGNOSIS — R74.8 ELEVATED LIVER ENZYMES: ICD-10-CM

## 2025-01-07 DIAGNOSIS — F17.219 CIGARETTE NICOTINE DEPENDENCE WITH NICOTINE-INDUCED DISORDER: ICD-10-CM

## 2025-01-07 DIAGNOSIS — E78.1 HYPERTRIGLYCERIDEMIA: ICD-10-CM

## 2025-01-07 DIAGNOSIS — F10.10 ALCOHOL ABUSE: ICD-10-CM

## 2025-01-07 DIAGNOSIS — F51.01 PRIMARY INSOMNIA: Primary | ICD-10-CM

## 2025-01-07 DIAGNOSIS — F10.931 ALCOHOL WITHDRAWAL DELIRIUM (HCC): ICD-10-CM

## 2025-01-07 DIAGNOSIS — G89.29 OTHER CHRONIC PAIN: ICD-10-CM

## 2025-01-07 DIAGNOSIS — I10 PRIMARY HYPERTENSION: ICD-10-CM

## 2025-01-07 DIAGNOSIS — F41.9 ANXIETY: ICD-10-CM

## 2025-01-07 PROCEDURE — 99214 OFFICE O/P EST MOD 30 MIN: CPT | Performed by: NURSE PRACTITIONER

## 2025-01-07 RX ORDER — TRAZODONE HYDROCHLORIDE 50 MG/1
50 TABLET, FILM COATED ORAL DAILY
Qty: 30 TABLET | Refills: 0 | Status: SHIPPED | OUTPATIENT
Start: 2025-01-10

## 2025-01-07 RX ORDER — CLONAZEPAM 2 MG/1
2 TABLET, ORALLY DISINTEGRATING ORAL
Qty: 30 TABLET | Refills: 0 | Status: SHIPPED | OUTPATIENT
Start: 2025-01-10

## 2025-01-07 NOTE — PROGRESS NOTES
Name: Qiana Sanches      : 1955      MRN: [id number]  Encounter Provider: PANCHITO Mccabe  Encounter Date: 2025   Encounter department: [facility]  :  Assessment & Plan  Anxiety  Patient continues with symptoms of anxiety.  Patient's primary place of residence is in [place] and she was seeing a provider there.  She has been in this area for several months and she is staying with a friend.  She has been prescribed Klonopin 2 mg at bedtime.  She has been taking the medication both at bedtime and during the day.  Other medications used to treat anxiety were discussed such as Lexapro or Zoloft.  She did try Zoloft recently but only took it for short period of time.  I did explain to her that these medications may take several weeks for her to feel a full effect.  She does not wish to wait that long.  She was recently hospitalized for alcohol withdrawal.  She has a lifetime history of alcoholism.  She denies any alcohol use since her discharge.  I did a long discussion with the patient regarding use of Klonopin for anxiety as well as her alcohol use.  A controlled substance agreement was signed today in the office.  She recently did have a drug screening which was negative.  I did explain the controlled substance agreement in detail to the patient.  She is aware we will not fill these medications sooner than 30 days nor will refill after hours or on the weekends.  She was offered a copy of the agreement and declined.  She states she will be moving back to [place] in the next week or 2.  She will reestablish care with a provider there.    Orders:    traZODone (DESYREL) 50 mg tablet; Take 1 tablet (50 mg total) by mouth daily Do not start before January 10, 2025.    clonazePAM (KlonoPIN) 2 MG disintegrating tablet; Take 1 tablet (2 mg total) by mouth daily at bedtime Do not start before January 10, 2025.    Primary insomnia  Patient continues on her Klonopin at bedtime for sleep.   She has been also taking this during the day as well.  She has been running out of her prescription.  She has also been taking melatonin.  Advised patient that she will remain on her Klonopin 2 mg at bedtime.  She is not to take this during the day.  Controlled Substance Review    [place] PDMP or [place]  reviewed: No red flags were identified; safe to proceed with prescription..      Orders:    clonazePAM (KlonoPIN) 2 MG disintegrating tablet; Take 1 tablet (2 mg total) by mouth daily at bedtime Do not start before January 10, 2025.    Primary hypertension  Patient continues on losartan 50 mg daily.  Blood pressure today is 114/72.         Cigarette nicotine dependence with nicotine-induced disorder  Continues to smoke.  Not interested in smoking cessation.         Alcohol abuse  Patient recently hospitalized for symptoms of alcohol withdrawal.  She was drinking up to 1 L of alcohol per day per the hospital record.  She was developing symptoms of withdrawal and was brought to the emergency room by a friend.  She did sign out AMA.  Patient states she is not drinking since this hospitalization.         Other chronic pain  Patient states she has a history of scoliosis and was on Percocet chronically for many years.  I did have a long discussion with the patient regarding the Percocet.  Our office will no longer prescribe this for her.  She declines having x-rays performed.  Recent chest x-ray performed while hospitalized did not reveal any signs of scoliosis.         Elevated liver enzymes  Recent liver enzymes while hospitalized normal.         Hypertriglyceridemia  Patient continues on her fenofibrate.  Declines lipid panel.         Bipolar disorder, current episode mixed, mild (HCC)  Diagnosed in the past.  Currently untreated.             Depression Screening and Follow-up Plan: Patient was screened for depression during today's encounter. They screened negative with a PHQ-2 score of 0.    Tobacco Cessation Counseling:  Tobacco cessation counseling was provided. The patient is sincerely urged to quit consumption of tobacco. She is not ready to quit tobacco.       History of Present Illness     Patient presents to the office today for follow-up and for explanation of her controlled substance agreement.  Patient was recently hospitalized for alcohol withdrawal.  Per the hospital records she is drinking upwards to 1 L of alcohol per day for many years.  She was prescribed Klonopin and Percocet by a physician in [place] where she primarily lives.  She is here visiting a friend.  She has been taking Klonopin more than directed.  While hospitalized she was prescribed trazodone once daily for 30 days.  This was 10 days ago.  She is almost out of this medication as well.  I did explain the controlled substance agreement to the patient.  She states that she will be moving back to [place] in the next week or 2.  She is aware that these medications will only be filled every 30 days.  She continues to decline any other anxiety medications such as SSRIs.          Review of Systems   Constitutional:  Negative for activity change, fatigue and fever.   HENT:  Negative for congestion, hearing loss, rhinorrhea, trouble swallowing and voice change.    Eyes:  Negative for photophobia, pain, discharge and visual disturbance.   Respiratory:  Negative for cough, chest tightness and shortness of breath.    Cardiovascular:  Negative for chest pain, palpitations and leg swelling.   Gastrointestinal:  Negative for abdominal pain, blood in stool, constipation, nausea and vomiting.   Endocrine: Negative for cold intolerance and heat intolerance.   Genitourinary:  Negative for difficulty urinating, frequency, hematuria, urgency, vaginal bleeding and vaginal discharge.   Musculoskeletal:  Negative for arthralgias and myalgias.   Skin: Negative.    Neurological:  Negative for dizziness, weakness, numbness and headaches.   Psychiatric/Behavioral:  Positive for sleep  "disturbance. Negative for decreased concentration. The patient is nervous/anxious.        Objective   /72   Pulse 92   Temp 97.6 °F (36.4 °C) (Tympanic)   Resp 18   Ht 4' 10.75\" (1.492 m)   Wt 54.7 kg (120 lb 9.6 oz)   SpO2 96%   BMI 24.57 kg/m²      Physical Exam  Vitals reviewed.   Constitutional:       Appearance: Normal appearance.   HENT:      Head: Normocephalic.      Nose: Nose normal.      Mouth/Throat:      Mouth: Mucous membranes are moist.      Pharynx: Oropharynx is clear.   Eyes:      Extraocular Movements: Extraocular movements intact.      Pupils: Pupils are equal, round, and reactive to light.   Cardiovascular:      Rate and Rhythm: Normal rate and regular rhythm.      Pulses: Normal pulses.   Pulmonary:      Effort: Pulmonary effort is normal.   Musculoskeletal:         General: Normal range of motion.   Skin:     General: Skin is warm and dry.   Neurological:      General: No focal deficit present.      Mental Status: She is alert and oriented to person, place, and time. Mental status is at baseline.   Psychiatric:         Attention and Perception: Attention and perception normal.         Mood and Affect: Mood is anxious.         Speech: Speech normal.         Behavior: Behavior is agitated. Behavior is cooperative.         Thought Content: Thought content normal.         Cognition and Memory: Cognition and memory normal.         Judgment: Judgment normal.         "

## 2025-01-07 NOTE — ASSESSMENT & PLAN NOTE
Patient continues with symptoms of anxiety.  Patient's primary place of residence is in [place] and she was seeing a provider there.  She has been in this area for several months and she is staying with a friend.  She has been prescribed Klonopin 2 mg at bedtime.  She has been taking the medication both at bedtime and during the day.  Other medications used to treat anxiety were discussed such as Lexapro or Zoloft.  She did try Zoloft recently but only took it for short period of time.  I did explain to her that these medications may take several weeks for her to feel a full effect.  She does not wish to wait that long.  She was recently hospitalized for alcohol withdrawal.  She has a lifetime history of alcoholism.  She denies any alcohol use since her discharge.  I did a long discussion with the patient regarding use of Klonopin for anxiety as well as her alcohol use.  A controlled substance agreement was signed today in the office.  She recently did have a drug screening which was negative.  I did explain the controlled substance agreement in detail to the patient.  She is aware we will not fill these medications sooner than 30 days nor will refill after hours or on the weekends.  She was offered a copy of the agreement and declined.  She states she will be moving back to [place] in the next week or 2.  She will reestablish care with a provider there.    Orders:    traZODone (DESYREL) 50 mg tablet; Take 1 tablet (50 mg total) by mouth daily Do not start before January 10, 2025.    clonazePAM (KlonoPIN) 2 MG disintegrating tablet; Take 1 tablet (2 mg total) by mouth daily at bedtime Do not start before January 10, 2025.

## 2025-01-07 NOTE — ASSESSMENT & PLAN NOTE
Patient continues on her Klonopin at bedtime for sleep.  She has been also taking this during the day as well.  She has been running out of her prescription.  She has also been taking melatonin.  Advised patient that she will remain on her Klonopin 2 mg at bedtime.  She is not to take this during the day.  Controlled Substance Review    [place] or [place]  reviewed: No red flags were identified; safe to proceed with prescription..      Orders:    clonazePAM (KlonoPIN) 2 MG disintegrating tablet; Take 1 tablet (2 mg total) by mouth daily at bedtime Do not start before January 10, 2025.

## 2025-01-07 NOTE — ASSESSMENT & PLAN NOTE
Patient states she has a history of scoliosis and was on Percocet chronically for many years.  I did have a long discussion with the patient regarding the Percocet.  Our office will no longer prescribe this for her.  She declines having x-rays performed.  Recent chest x-ray performed while hospitalized did not reveal any signs of scoliosis.

## 2025-01-07 NOTE — ASSESSMENT & PLAN NOTE
Patient recently hospitalized for symptoms of alcohol withdrawal.  She was drinking up to 1 L of alcohol per day per the hospital record.  She was developing symptoms of withdrawal and was brought to the emergency room by a friend.  She did sign out AMA.  Patient states she is not drinking since this hospitalization.